# Patient Record
(demographics unavailable — no encounter records)

---

## 2025-05-01 NOTE — PHYSICAL EXAM
[2+] : left foot dorsalis pedis 2+ [Varicose Veins Of Lower Extremities] : not present [FreeTextEntry3] : Temperature gradient within normal limits. CFT: 4 seconds x10.  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 There is slightly shorter 1st ray due to HAV deformity and elongated 2nd and 3rd digits bilaterally.  [FreeTextEntry1] : Epicritic sensation and light touch are intact bilateral.

## 2025-05-01 NOTE — ASSESSMENT
[FreeTextEntry1] : Impression: Metatarsalgia bilaterally (M77.40). Pain in foot (M79.673).  Treatment: Discussed findings and conditions with the patient. Discussed pedal care. She is to avoid walking barefoot in the house. She is to stay with Oofos or similar that she has purchased to help off-load the forefoot and provide additional cushioning. discussed shoe gear modification. I recommended avoiding the Skecher's that she is wearing today as they are too narrow in the forefoot and flexible in the forefoot as well that could be contributing to her pain. The bilateral lesions were prepped with alcohol and trimmed with a sterile #15 blade without incidence. Discussed use of Powerstep 3/4 length insoles for all her shoes vs. metatarsal pad to help off-load the area. Discussed the possibility of recurrence with the patient. Discussed surgical intervention with her bunion deformity but she has no pain at the bunion sites and 1st ray at this time and would recommend continuing conservative measures and I do not recommend any surgical intervention at this time as she has no pain.  She is to follow-up as needed. Any redness, pain, problems, or concerns, she is to contact the office.

## 2025-05-01 NOTE — PHYSICAL EXAM
[2+] : left foot dorsalis pedis 2+ [Varicose Veins Of Lower Extremities] : not present [FreeTextEntry3] : Temperature gradient within normal limits. CFT: 4 seconds x10.  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 There is slightly shorter 1st ray due to HAV deformity and elongated 2nd and 3rd digits bilaterally.  [FreeTextEntry1] : Epicritic sensation and light touch are intact bilateral.

## 2025-05-01 NOTE — HISTORY OF PRESENT ILLNESS
[FreeTextEntry1] : Patient presents today with complaint of tenderness and pain in the plantar aspect of the right and left submet. 2. She states the lesions have been present. She has not experienced pain since her last visit approximately 1 1/2 years ago back in 2023. She denies any history or trauma. She is wearing a Skecher type shoe today but does have other sneakers and different shoes around the house and has seen a significant improvement in the area up until recently. She states she is going away and wanted to be evaluated and treated before leaving. She denies any changes in her medical conditions or medications. She denies any swelling to the foot. She denies any trauma to the foot itself.